# Patient Record
Sex: MALE | Race: BLACK OR AFRICAN AMERICAN | Employment: STUDENT | URBAN - METROPOLITAN AREA
[De-identification: names, ages, dates, MRNs, and addresses within clinical notes are randomized per-mention and may not be internally consistent; named-entity substitution may affect disease eponyms.]

---
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
## 2023-09-22 NOTE — LETTER
NOTIFICATION RETURN TO WORK / SCHOOL 
 
10/28/2019 5:10 PM 
 
Mr. Petra Wade [id number] [address] [place] [address] To Whom It May Concern: 
 
Petra Wade is currently under the care of [id number] [place] [address] EMERGENCY DEPT. He will return to work/school on: 10/31/19 If there are questions or concerns please have the patient contact our office. Sincerely, Foster Wilkins PA-C  
 
                                
 

None known
